# Patient Record
Sex: FEMALE | Race: WHITE | Employment: UNEMPLOYED | URBAN - METROPOLITAN AREA
[De-identification: names, ages, dates, MRNs, and addresses within clinical notes are randomized per-mention and may not be internally consistent; named-entity substitution may affect disease eponyms.]

---

## 2024-07-31 ENCOUNTER — HOSPITAL ENCOUNTER (OUTPATIENT)
Facility: HOSPITAL | Age: 49
Setting detail: OUTPATIENT SURGERY
Discharge: HOME | End: 2024-07-31
Attending: INTERNAL MEDICINE | Admitting: INTERNAL MEDICINE
Payer: MEDICAID

## 2024-07-31 ENCOUNTER — ANESTHESIA EVENT (OUTPATIENT)
Facility: HOSPITAL | Age: 49
End: 2024-07-31
Payer: MEDICAID

## 2024-07-31 ENCOUNTER — ANESTHESIA (OUTPATIENT)
Facility: HOSPITAL | Age: 49
End: 2024-07-31
Payer: MEDICAID

## 2024-07-31 VITALS
DIASTOLIC BLOOD PRESSURE: 61 MMHG | OXYGEN SATURATION: 99 % | BODY MASS INDEX: 25.87 KG/M2 | HEIGHT: 63 IN | WEIGHT: 146 LBS | HEART RATE: 75 BPM | RESPIRATION RATE: 18 BRPM | SYSTOLIC BLOOD PRESSURE: 98 MMHG | TEMPERATURE: 97.2 F

## 2024-07-31 PROCEDURE — 88305 TISSUE EXAM BY PATHOLOGIST: CPT

## 2024-07-31 PROCEDURE — 3700000001 HC ADD 15 MINUTES (ANESTHESIA): Performed by: INTERNAL MEDICINE

## 2024-07-31 PROCEDURE — 3600007502: Performed by: INTERNAL MEDICINE

## 2024-07-31 PROCEDURE — 2500000003 HC RX 250 WO HCPCS: Performed by: NURSE ANESTHETIST, CERTIFIED REGISTERED

## 2024-07-31 PROCEDURE — 2580000003 HC RX 258: Performed by: NURSE ANESTHETIST, CERTIFIED REGISTERED

## 2024-07-31 PROCEDURE — 6360000002 HC RX W HCPCS: Performed by: NURSE ANESTHETIST, CERTIFIED REGISTERED

## 2024-07-31 PROCEDURE — 3600007512: Performed by: INTERNAL MEDICINE

## 2024-07-31 PROCEDURE — 3700000000 HC ANESTHESIA ATTENDED CARE: Performed by: INTERNAL MEDICINE

## 2024-07-31 PROCEDURE — 2709999900 HC NON-CHARGEABLE SUPPLY: Performed by: INTERNAL MEDICINE

## 2024-07-31 PROCEDURE — 7100000010 HC PHASE II RECOVERY - FIRST 15 MIN: Performed by: INTERNAL MEDICINE

## 2024-07-31 PROCEDURE — 7100000011 HC PHASE II RECOVERY - ADDTL 15 MIN: Performed by: INTERNAL MEDICINE

## 2024-07-31 RX ORDER — LIDOCAINE HYDROCHLORIDE 20 MG/ML
INJECTION, SOLUTION EPIDURAL; INFILTRATION; INTRACAUDAL; PERINEURAL PRN
Status: DISCONTINUED | OUTPATIENT
Start: 2024-07-31 | End: 2024-07-31 | Stop reason: SDUPTHER

## 2024-07-31 RX ORDER — SODIUM CHLORIDE 0.9 % (FLUSH) 0.9 %
5-40 SYRINGE (ML) INJECTION EVERY 12 HOURS SCHEDULED
Status: DISCONTINUED | OUTPATIENT
Start: 2024-07-31 | End: 2024-07-31 | Stop reason: HOSPADM

## 2024-07-31 RX ORDER — SODIUM CHLORIDE 0.9 % (FLUSH) 0.9 %
5-40 SYRINGE (ML) INJECTION PRN
Status: DISCONTINUED | OUTPATIENT
Start: 2024-07-31 | End: 2024-07-31 | Stop reason: HOSPADM

## 2024-07-31 RX ORDER — SODIUM CHLORIDE 9 MG/ML
INJECTION, SOLUTION INTRAVENOUS CONTINUOUS PRN
Status: DISCONTINUED | OUTPATIENT
Start: 2024-07-31 | End: 2024-07-31 | Stop reason: SDUPTHER

## 2024-07-31 RX ORDER — SODIUM CHLORIDE 9 MG/ML
25 INJECTION, SOLUTION INTRAVENOUS PRN
Status: DISCONTINUED | OUTPATIENT
Start: 2024-07-31 | End: 2024-07-31 | Stop reason: HOSPADM

## 2024-07-31 RX ORDER — SODIUM CHLORIDE 9 MG/ML
INJECTION, SOLUTION INTRAVENOUS CONTINUOUS
Status: DISCONTINUED | OUTPATIENT
Start: 2024-07-31 | End: 2024-07-31 | Stop reason: HOSPADM

## 2024-07-31 RX ORDER — ACETAMINOPHEN 160 MG
TABLET,DISINTEGRATING ORAL
COMMUNITY

## 2024-07-31 RX ADMIN — PROPOFOL 50 MG: 10 INJECTION, EMULSION INTRAVENOUS at 13:06

## 2024-07-31 RX ADMIN — PROPOFOL 40 MG: 10 INJECTION, EMULSION INTRAVENOUS at 13:09

## 2024-07-31 RX ADMIN — PROPOFOL 50 MG: 10 INJECTION, EMULSION INTRAVENOUS at 12:55

## 2024-07-31 RX ADMIN — LIDOCAINE HYDROCHLORIDE 100 MG: 20 INJECTION, SOLUTION EPIDURAL; INFILTRATION; INTRACAUDAL; PERINEURAL at 12:55

## 2024-07-31 RX ADMIN — PROPOFOL 30 MG: 10 INJECTION, EMULSION INTRAVENOUS at 12:58

## 2024-07-31 RX ADMIN — PROPOFOL 30 MG: 10 INJECTION, EMULSION INTRAVENOUS at 12:56

## 2024-07-31 RX ADMIN — PROPOFOL 50 MG: 10 INJECTION, EMULSION INTRAVENOUS at 13:02

## 2024-07-31 RX ADMIN — SODIUM CHLORIDE: 9 INJECTION, SOLUTION INTRAVENOUS at 12:45

## 2024-07-31 NOTE — ANESTHESIA PRE PROCEDURE
Department of Anesthesiology  Preprocedure Note       Name:  Tatiana Montoya   Age:  48 y.o.  :  1975                                          MRN:  [id number]         Date:  2024      Surgeon: Surgeon(s):  Nelia Epps MD    Procedure: Procedure(s):  COLONOSCOPY    Medications prior to admission:   Prior to Admission medications    Medication Sig Start Date End Date Taking? Authorizing Provider   Cholecalciferol (VITAMIN D3) 50 MCG ( [place]) CAPS Take by mouth   Yes Provider, MD Margaret       Current medications:    Current Facility-Administered Medications   Medication Dose Route Frequency Provider Last Rate Last Admin   • 0.9 % sodium chloride infusion   IntraVENous Continuous Nelia Epps MD       • sodium chloride flush 0.9 % injection 5-40 mL  5-40 mL IntraVENous 2 times per day Nelia Epps MD       • sodium chloride flush 0.9 % injection 5-40 mL  5-40 mL IntraVENous PRN Nelia Epps MD       • 0.9 % sodium chloride infusion  25 mL IntraVENous PRN Nelia Epps MD         Facility-Administered Medications Ordered in Other Encounters   Medication Dose Route Frequency Provider Last Rate Last Admin   • 0.9 % sodium chloride infusion   IntraVENous Continuous PRN StorsSree ibanez P, APRN - CRNA   New Bag at 24 1245   • lidocaine PF 2 % injection   IntraVENous PRN AlbarosSree ibanez P, APRN - CRNA   100 mg at 24 1255   • propofol infusion   IntraVENous PRN StorsSree ibanez P, APRN - CRNA   30 mg at 24 1258       Allergies:  No Known Allergies    Problem List:  There is no problem list on file for this patient.      Past Medical History:  History reviewed. No pertinent past medical history.    Past Surgical History:        Procedure Laterality Date   • ANKLE FRACTURE SURGERY      plate in place   • BREAST REDUCTION SURGERY Bilateral        Social History:    Social History     Tobacco Use   • Smoking status: Not on file   • Smokeless tobacco: Not on file   Substance Use

## 2024-07-31 NOTE — DISCHARGE INSTRUCTIONS
[facility]  [address] [place] [id number]          Tatiana Montoya  [id number]  1975    COLON DISCHARGE INSTRUCTIONS    DISCOMFORT:  Redness at IV site- apply warm compress to area; if redness or soreness persist- contact your physician  There may be a slight amount of blood passed from the rectum  Gaseous discomfort- walking, belching will help relieve any discomfort    DIET:   Regular diet.     ACTIVITY:  You may resume your normal daily activities it is recommended that you spend the remainder of the day resting -  avoid any strenuous activity.  You may not operate a vehicle for 12 hours  You may not engage in an occupation involving machinery or appliances for rest of today  You may not drink alcoholic beverages for at least 12 hours  Avoid making any critical decisions for at least 24 hour    CALL M.D.  ANY SIGN OF:   Increasing pain, nausea, vomiting  Abdominal distension (swelling)  New increased bleeding (oral or rectal)  Fever (chills)  Pain in chest area  Bloody discharge from nose or mouth  Shortness of breath     Follow-up Instructions:   Call Dr. Nelia Epps for any questions or problems.   Telephone # [phone]  Biopsy results will be available in  5 to 7 days    Impression:  -Total 2 small (3-5 mm) polyps found in the ascending colon - removed and sent for pathology  -Otherwise unremarkable exam.     Recommendations:   -Resume normal medication(s).  -Resume regular diet   -Await pathology.  -If adenoma is present, repeat colonoscopy in 5 years.      Nelia Epps MD         Colon Polyps: Care Instructions  Your Care Instructions     Colon polyps are growths in the colon or the rectum. The cause of most colon polyps is not known, and most people who get them do not have any problems. But a certain kind can turn into cancer. For this reason, regular testing for colon polyps is important for people as they get older. It is also important for anyone who has an

## 2024-07-31 NOTE — PERIOP NOTE

## 2024-07-31 NOTE — H&P
[facility] [address] [address] [phone]        History and Physical     NAME:  Tatiana Montoya   :  1975   MRN:  [id number]         HPI:  Tatiana Montoya is a 48 y.o. female here for colonoscopy. No prior colonoscopy. Patient denies any family h/o colon cancer. No GI symptoms.     Past Surgical History:   Procedure Laterality Date    ANKLE FRACTURE SURGERY      plate in place    BREAST REDUCTION SURGERY Bilateral      History reviewed. No pertinent past medical history.     No current facility-administered medications on file prior to encounter.     Current Outpatient Medications on File Prior to Encounter   Medication Sig Dispense Refill    Cholecalciferol (VITAMIN D3) 50 MCG (2000) CAPS Take by mouth       No Known Allergies  History reviewed. No pertinent family history.  Current Facility-Administered Medications   Medication Dose Route Frequency    0.9 % sodium chloride infusion   IntraVENous Continuous    sodium chloride flush 0.9 % injection 5-40 mL  5-40 mL IntraVENous 2 times per day    sodium chloride flush 0.9 % injection 5-40 mL  5-40 mL IntraVENous PRN    0.9 % sodium chloride infusion  25 mL IntraVENous PRN       PHYSICAL EXAM:    BP (!) 102/53   Pulse 83   Resp 10   Ht 1.6 m (5' 3\")   Wt 66.2 kg (146 lb)   BMI 25.86 kg/m²     General: Alert, cooperative, no acute distress    HEENT: Atraumatic.  PERRLA, EOMI. Anicteric sclerae.  Lungs:  Comfortable breathing. No obvious use of accessory muscles. Not on oxygen  Abdomen: Soft, Non distended, Non tender. No hepatosplenomegaly  Extremities: No cyanosis or edema  Neurologic:  CN 2-12 grossly intact, Alert and oriented X 3.  No acute neurological distress   Psych:   Good insight. Not anxious nor agitated.     Assessment:   Average risk screening, no prior colonoscopy    Plan:   Endoscopic procedure: Colonoscopy  Anesthesia plan: Monitored Anesthesia Care    Nelia Epps

## 2024-07-31 NOTE — ANESTHESIA POSTPROCEDURE EVALUATION
Department of Anesthesiology  Postprocedure Note    Patient: Tatiana Montoya  MRN: [id number]  YOB: 1975  Date of evaluation: 7/31/2024    Procedure Summary       Date: 07/31/24 Room / Location: [place] [address] / [facility] ENDOSCOPY    Anesthesia Start: 1254 Anesthesia Stop: 1313    Procedure: COLONOSCOPY (Lower GI Region) Diagnosis:       Colon cancer screening      (Colon cancer screening [Z12.11])    Surgeons: Nelia Epps MD Responsible Provider: Aiden Coronado MD    Anesthesia Type: MAC ASA Status: 1            Anesthesia Type: MAC    Adilene Phase I: Adilene Score: 10    Adilene Phase II: Adilene Score: 9    Anesthesia Post Evaluation    Patient location during evaluation: bedside  Nausea & Vomiting: no nausea  Cardiovascular status: blood pressure returned to baseline  Respiratory status: acceptable  Hydration status: euvolemic    No notable events documented.

## 2024-07-31 NOTE — OP NOTE
[facility] [address] [address] [phone]               Colonoscopy Operative Report      Indications: Average risk screening, no prior colonoscopy    :  Nelia Epps MD    Staff: Circulator: Soco Blair, RN; Beatrice Ramesh, JUDITH     Referring Provider: No primary care provider on file.    Sedation:  MAC anesthesia    Procedure Details:  After informed consent was obtained with all risks and benefits of procedure explained and preoperative exam completed, the patient was taken to the endoscopy suite and placed in the left lateral decubitus position.  Upon sequential sedation as per above, a digital rectal exam was performed  And was normal.  The Olympus videocolonoscope  was inserted in the rectum and carefully advanced to the cecum, which was identified by the ileocecal valve and appendiceal orifice.  The quality of preparation was good.  The colonoscope was slowly withdrawn with careful evaluation between folds. Retroflexion in the rectum was performed and was normal..     Findings:   Rectum: normal  Sigmoid: normal  Descending Colon: normal  Transverse Colon: normal  Ascending Colon: 2  Sessile polyp(s), the largest 5 mm in size;  Cecum: normal  Terminal Ileum: not intubated    Interventions:  2 complete polypectomy were performed using cold snare  and cold biopsy forceps and the polyps were  retrieved    Specimen Removed:   ID Type Source Tests Collected by Time Destination   1 : Ascending colon polyps-2 Tissue Colon-Ascending SURGICAL PATHOLOGY Nelia Epps MD 7/31/2024 1302        EBL:  Minimal    Complications:  None; patient tolerated the procedure well.    Impression:  -Total 2 small (3-5 mm) polyps found in the ascending colon - removed and sent for pathology  -Otherwise unremarkable exam.     Recommendations:   -Resume normal medication(s).  -Resume regular diet   -Await pathology.  -If adenoma is present, repeat colonoscopy in 5

## (undated) DEVICE — FORCEPS BX L240CM JAW DIA2.8MM L CAP W/ NDL MIC MESH TOOTH

## (undated) DEVICE — SNARE VASC L240CM LOOP W10MM SHTH DIA2.4MM RND STIFF CLD

## (undated) DEVICE — SUPPLEMENT DIGESTIVE H2O SOL GI-EASE

## (undated) DEVICE — TRAP SURG QUAD PARABOLA SLOT DSGN SFTY SCRN TRAPEASE

## (undated) DEVICE — TUBING IRRIG COMPATIBLE W ERBE MEDIVATOR PMP HYDR